# Patient Record
Sex: MALE | Race: WHITE | URBAN - METROPOLITAN AREA
[De-identification: names, ages, dates, MRNs, and addresses within clinical notes are randomized per-mention and may not be internally consistent; named-entity substitution may affect disease eponyms.]

---

## 2021-07-12 ENCOUNTER — HISTORICAL (OUTPATIENT)
Dept: ADMINISTRATIVE | Facility: HOSPITAL | Age: 26
End: 2021-07-12

## 2022-04-10 ENCOUNTER — HISTORICAL (OUTPATIENT)
Dept: ADMINISTRATIVE | Facility: HOSPITAL | Age: 27
End: 2022-04-10

## 2022-04-30 VITALS
BODY MASS INDEX: 21 KG/M2 | DIASTOLIC BLOOD PRESSURE: 79 MMHG | WEIGHT: 147.5 LBS | BODY MASS INDEX: 21.84 KG/M2 | SYSTOLIC BLOOD PRESSURE: 118 MMHG | WEIGHT: 141.75 LBS | HEIGHT: 69 IN | OXYGEN SATURATION: 99 % | HEIGHT: 69 IN

## 2022-05-02 NOTE — HISTORICAL [facility] CERNER
This is a historical note converted from [employer]. Formatting and pictures may have been removed.  Please reference [place] for original formatting and attached multimedia. Chief Complaint  left medial ankle laceration with posterior tibialis tendon injury.  History of Present Illness  lt ankle sutures done on july 10th [facility],?was?not xrayed during that visit. laceration accidental from trampoline pole while assembling.?I do not see documentation in provider note about Tetanus update or declination. Pt needs pcp & Ortho referral. Was instructed to f/u with Dr. Alice Rawls but pt is uninsured at this time.  Review of Systems  Constitutional:?negative except as stated in HPI  Eye:?negative except as stated in HPI  ENMT:?negative except as stated in HPI  Respiratory:?negative except as stated in HPI  Cardiovascular:?negative except as stated in HPI  Gastrointestinal:?negative except as stated in HPI  Genitourinary:?negative except as stated in HPI  Hema/Lymph:?negative except as stated in HPI  Endocrine:?negative except as stated in HPI  Musculoskeletal:?negative except as stated in HPI  Integumentary:?negative except as stated in HPI  Neurologic: negative except as stated in HPI  Physical Exam  Vitals & Measurements  T:?36.7? ?C (Oral)? HR:?75(Peripheral)? RR:?16? BP:?118/79? SpO2:?99%?  HT:?174.00?cm? WT:?64.300?kg? BMI:?21.24?  General:?well-developed well-nourished in no acute distress  Eye: PERRLA, EOMI, clear conjunctiva, eyelids normal  Neck: full range of motion, no thyromegaly or lymphadenopathy  Musculoskeletal:?left medial ankle mild diffuse non pitting edema; using crutches; limited ROM  Integumentary: small laceration left medial ankle, dried blood, sutures in place and wound is well approximated. no signs of secondary infection. foot with CMS intact.  Neurologic: cranial nerves?grossly?intact, no signs of peripheral neurological deficit  Assessment/Plan  1.?Laceration of left  ankle?S91.012A  Laceration of ankle?S91.019A  ?continue keflex.  continue to be out of work and using crutches to ambulate until seen by ortho.  xray obtained today.  no pain medication requested/needed.  PCP referral made today.  pt states Tetanus vaccine was updated in [place] [facility].  Referrals  [facility] Internal Referral to [facility] Medicine Clinic, Specialty: Primary Care Physicians, Reason: establish PCP., Refer To: Juanito RODRIGUEZ, Danya PADILLA, [facility], --., Start: 07/12/21 10:26:00 CDT  [facility] Internal Referral to Orthopedics Clinic, Specialty: Orthopedic Surgery, Reason: left medial ankle laceration with posterior tibialis tendon involvement and limited ROM., Type: simple closure at [place] [facility]; Xray [facility]; on keflex and crutches., Start: 07/12/21 10:27:00 CDT, Urgent   Problem List/Past Medical History  Ongoing  No chronic problems  Historical  No qualifying data  Medications  Keflex 500 mg oral capsule, 500 mg= 1 cap(s), Oral, q12hr  Allergies  No Known Allergies  Social History  Abuse/Neglect  No, No, Yes, 07/12/2021  Tobacco  Never (less than 100 in lifetime), N/A, 07/12/2021  Immunizations  Vaccine Date Status   tetanus/diphtheria/pertussis, acel(Tdap) 07/10/2021 Given   Health Maintenance  Health Maintenance  ???Pending?(in the next year)  ???There are no current recommendations pending  ??? ??Due In Future?  ??? ? ? ?Obesity Screening not due until??01/01/22??and every 1??year(s)  ??? ? ? ?Alcohol Misuse Screening not due until??01/02/22??and every 1??year(s)  ???Satisfied?(in the past 1 year)  ??? ??Satisfied?  ??? ? ? ?ADL Screening on??07/12/21.??Satisfied by Mayra Garcia LPN.  ??? ? ? ?Alcohol Misuse Screening on??07/12/21.??Satisfied by Mayra Garcia LPN.  ??? ? ? ?Blood Pressure Screening on??07/12/21.??Satisfied by Jose LPN, Mayra P.  ??? ? ? ?Body Mass Index Check on??07/12/21.??Satisfied by Mayra Garcia LPN.  ??? ? ? ?Obesity Screening on??07/12/21.??Satisfied by Mayra Garcia LPN.  ??? ? ?  ?Tetanus Vaccine on??07/10/21.??Satisfied by Pierce LE, Pati TURK  ?  Diagnostic Results  (07/12/2021 10:33 CDT XR Ankle Left Minimum 3 Views)  Radiology Report  XR Ankle Left Minimum 3 Views  ?  REASON FOR EXAM: Ankle laceration  ?  COMPARISON: No relevant comparison studies available at the time of  dictation.  ?  FINDINGS: Mild medial soft tissue swelling. Symmetric ankle mortise.  No acute fracture identified. Normal bone mineralization. No retained  radiopaque foreign body.  ?  IMPRESSION: No acute osseous process identified.  ?  ?  ?  Signature Line  Electronically Signed By: Demian Solomon MD  Date/Time Signed: 07/12/2021 11:14 [1]     [1]?XR Ankle Left Minimum 3 Views; Demian Solomon MD 07/12/2021 10:33 CDT